# Patient Record
Sex: FEMALE | Employment: FULL TIME | URBAN - METROPOLITAN AREA
[De-identification: names, ages, dates, MRNs, and addresses within clinical notes are randomized per-mention and may not be internally consistent; named-entity substitution may affect disease eponyms.]

---

## 2020-05-10 ENCOUNTER — APPOINTMENT (OUTPATIENT)
Dept: GENERAL RADIOLOGY | Facility: CLINIC | Age: 45
End: 2020-05-10
Attending: EMERGENCY MEDICINE
Payer: MEDICAID

## 2020-05-10 ENCOUNTER — HOSPITAL ENCOUNTER (EMERGENCY)
Facility: CLINIC | Age: 45
Discharge: HOME | End: 2020-05-10
Attending: EMERGENCY MEDICINE | Admitting: EMERGENCY MEDICINE
Payer: MEDICAID

## 2020-05-10 ENCOUNTER — NURSE TRIAGE (OUTPATIENT)
Dept: NURSING | Facility: CLINIC | Age: 45
End: 2020-05-10

## 2020-05-10 VITALS
TEMPERATURE: 96.8 F | OXYGEN SATURATION: 98 % | DIASTOLIC BLOOD PRESSURE: 82 MMHG | HEART RATE: 64 BPM | RESPIRATION RATE: 18 BRPM | SYSTOLIC BLOOD PRESSURE: 121 MMHG

## 2020-05-10 DIAGNOSIS — R07.9 CHEST PAIN, UNSPECIFIED TYPE: ICD-10-CM

## 2020-05-10 DIAGNOSIS — R05.9 COUGH: ICD-10-CM

## 2020-05-10 LAB
ANION GAP SERPL CALCULATED.3IONS-SCNC: 4 MMOL/L (ref 3–14)
BASOPHILS # BLD AUTO: 0 10E9/L (ref 0–0.2)
BASOPHILS NFR BLD AUTO: 0.2 %
BUN SERPL-MCNC: 8 MG/DL (ref 7–30)
CALCIUM SERPL-MCNC: 9.1 MG/DL (ref 8.5–10.1)
CHLORIDE SERPL-SCNC: 106 MMOL/L (ref 94–109)
CO2 SERPL-SCNC: 28 MMOL/L (ref 20–32)
CREAT SERPL-MCNC: 0.57 MG/DL (ref 0.52–1.04)
D DIMER PPP FEU-MCNC: <0.3 UG/ML FEU (ref 0–0.5)
DIFFERENTIAL METHOD BLD: ABNORMAL
EOSINOPHIL # BLD AUTO: 0.1 10E9/L (ref 0–0.7)
EOSINOPHIL NFR BLD AUTO: 1.2 %
ERYTHROCYTE [DISTWIDTH] IN BLOOD BY AUTOMATED COUNT: 13.9 % (ref 10–15)
GFR SERPL CREATININE-BSD FRML MDRD: >90 ML/MIN/{1.73_M2}
GLUCOSE SERPL-MCNC: 95 MG/DL (ref 70–99)
HCT VFR BLD AUTO: 41.9 % (ref 35–47)
HGB BLD-MCNC: 13.4 G/DL (ref 11.7–15.7)
IMM GRANULOCYTES # BLD: 0 10E9/L (ref 0–0.4)
IMM GRANULOCYTES NFR BLD: 0.3 %
INTERPRETATION ECG - MUSE: NORMAL
LYMPHOCYTES # BLD AUTO: 2.1 10E9/L (ref 0.8–5.3)
LYMPHOCYTES NFR BLD AUTO: 33 %
MCH RBC QN AUTO: 25.2 PG (ref 26.5–33)
MCHC RBC AUTO-ENTMCNC: 32 G/DL (ref 31.5–36.5)
MCV RBC AUTO: 79 FL (ref 78–100)
MONOCYTES # BLD AUTO: 0.6 10E9/L (ref 0–1.3)
MONOCYTES NFR BLD AUTO: 8.8 %
NEUTROPHILS # BLD AUTO: 3.7 10E9/L (ref 1.6–8.3)
NEUTROPHILS NFR BLD AUTO: 56.5 %
NRBC # BLD AUTO: 0 10*3/UL
NRBC BLD AUTO-RTO: 0 /100
PLATELET # BLD AUTO: 414 10E9/L (ref 150–450)
POTASSIUM SERPL-SCNC: 4.2 MMOL/L (ref 3.4–5.3)
RBC # BLD AUTO: 5.31 10E12/L (ref 3.8–5.2)
SARS-COV-2 PCR COMMENT: ABNORMAL
SARS-COV-2 RNA SPEC QL NAA+PROBE: NORMAL
SARS-COV-2 RNA SPEC QL NAA+PROBE: POSITIVE
SODIUM SERPL-SCNC: 138 MMOL/L (ref 133–144)
SPECIMEN SOURCE: ABNORMAL
SPECIMEN SOURCE: NORMAL
TROPONIN I SERPL-MCNC: <0.015 UG/L (ref 0–0.04)
WBC # BLD AUTO: 6.5 10E9/L (ref 4–11)

## 2020-05-10 PROCEDURE — 87635 SARS-COV-2 COVID-19 AMP PRB: CPT | Performed by: EMERGENCY MEDICINE

## 2020-05-10 PROCEDURE — 99285 EMERGENCY DEPT VISIT HI MDM: CPT | Mod: 25

## 2020-05-10 PROCEDURE — 84484 ASSAY OF TROPONIN QUANT: CPT | Performed by: EMERGENCY MEDICINE

## 2020-05-10 PROCEDURE — 93005 ELECTROCARDIOGRAM TRACING: CPT

## 2020-05-10 PROCEDURE — 85025 COMPLETE CBC W/AUTO DIFF WBC: CPT | Performed by: EMERGENCY MEDICINE

## 2020-05-10 PROCEDURE — 80048 BASIC METABOLIC PNL TOTAL CA: CPT | Performed by: EMERGENCY MEDICINE

## 2020-05-10 PROCEDURE — 85379 FIBRIN DEGRADATION QUANT: CPT | Performed by: EMERGENCY MEDICINE

## 2020-05-10 PROCEDURE — 71045 X-RAY EXAM CHEST 1 VIEW: CPT

## 2020-05-10 ASSESSMENT — ENCOUNTER SYMPTOMS
SHORTNESS OF BREATH: 1
SORE THROAT: 0
FEVER: 1
VOMITING: 0
DIARRHEA: 0
COUGH: 1

## 2020-05-10 NOTE — ED AVS SNAPSHOT
Emergency Department  [address] [place] [address]  Phone:  [phone]  Fax:  [phone]                                    José Johnson   MRN: [id number]    Department:   Emergency Department   Date of Visit:  5/10/2020           After Visit Summary Signature Page    I have received my discharge instructions, and my questions have been answered. I have discussed any challenges I see with this plan with the nurse or doctor.    ..........................................................................................................................................  Patient/Patient Representative Signature      ..........................................................................................................................................  Patient Representative Print Name and Relationship to Patient    ..................................................               ................................................  Date                                   Time    ..........................................................................................................................................  Reviewed by Signature/Title    ...................................................              ..............................................  Date                                               Time          22EPIC Rev 08/18

## 2020-05-10 NOTE — TELEPHONE ENCOUNTER
"Mattie calls to say patient asked Mattie to call.  Patient speaks either Malaysian or Yi.  FNA advised it's best for patient to call in and  FNA get get an .    Mattie reports about 10 days ago, patient's  was not feeling good, was tested for COVID and was positive.   Then patient started having a fever and shortness of breath but this resolved.  This morning, patient had sudden pain back moving \"forward\" to her chest (left side).  Patient had described pain sharp throbbing.  Also, in the last couple of days, patient has  had numbness in left face and toes.  Patient is aspirin but Mattie does not know the amount.  She is taking it for a headache.  Patient just applied for insurance ([place]?) and it starts June 1st.  Patient is reluctant to go to the ED due to cost.  FNA advised patient may need to be seen in ED but she can call back to be triaged.        Reason for Disposition    SEVERE chest pain    Additional Information    [1] Caller is not with the adult (patient) AND [2] reporting urgent symptoms    Negative: Followed a chest injury    Negative: Severe difficulty breathing (e.g., struggling for each breath, speaks in single words)    Negative: Difficult to awaken or acting confused (e.g., disoriented, slurred speech)    Negative: Shock suspected (e.g., cold/pale/clammy skin, too weak to stand, low BP, rapid pulse)    Negative: [1] Chest pain lasts > 5 minutes AND [2] history of heart disease  (i.e., heart attack, bypass surgery, angina, angioplasty, CHF; not just a heart murmur)    Negative: [1] Chest pain lasts > 5 minutes AND [2] described as crushing, pressure-like, or heavy    Negative: [1] Chest pain lasts > 5 minutes AND [2] age > 50    Negative: [1] Chest pain lasts > 5 minutes AND [2] age > 30 AND [3] at least one cardiac risk factor (i.e., hypertension, diabetes, obesity, smoker or strong family history of heart disease)    Negative: [1] Chest pain lasts > 5 minutes AND [2] not relieved with " nitroglycerin    Negative: Passed out (i.e., lost consciousness, collapsed and was not responding)    Negative: Heart beating < 50 beats per minute OR > 140 beats per minute    Negative: Visible sweat on face or sweat dripping down face    Negative: Sounds like a life-threatening emergency to the triager    Protocols used: CHEST PAIN-A-AH, INFORMATION ONLY CALL-A-AH

## 2020-05-10 NOTE — ED PROVIDER NOTES
History     Chief Complaint:  Chest Pain; Shortness of Breath       The history is provided by the patient. A  was used ( Spanish).      José Johnson is a 44 year old female who presents for evaluation of intermittent left sided chest pain and shortness of breath starting this morning. She notes that the pain is sharp in nature. She endorses a slight cough that started a few days ago. The patient states that one week ago she had a fever of 38C which then resolved. She checked her blood pressure at home which was 140/100 which prompted her to call the nurse line and to be referred to the ED. She denies any vomiting, diarrhea, headache, sore throat, rash, recent travel or tobacco use. The patient states that her  recently tested positive for Covid-19 and is concerned for this. Of note, she had been taking care of her  when he was ill, but when they found that his Covid testing was positive she stopped being in close proximity to him.    Cardiac Risk Factors:  SEX:              female  Tobacco:              Negative  Hypertension:       Negative  Diabetes:             Negative  Lipids:                Negative  Personal history:  Negative  Family History:       Negative    PE and DVT Risk Factors:  Personal hx of PE/DVT:  Negative  Family hx of PE/DVT:   Negative  Recent travel:    Negative  Recent surgery:   Negative  Other immobilizations:  Negative  Hx cancer:    Negative  Hormone use:   Negative    Allergies:  No Known Drug Allergies    Medications:    The patient is not currently taking any prescribed medications.    Past Medical History:    History reviewed. No pertinent past medical history.    Past Surgical History:    History reviewed.  No pertinent past surgical history.    Family History:    History reviewed. No pertinent family history.    Social History:  The patient presents to the ED alone.  Smoking Status: Never Smoker  Smokeless Tobacco: Never Used  PCP: No  primary care provider on file.     Review of Systems   Constitutional: Positive for fever.   HENT: Negative for sore throat.    Respiratory: Positive for cough and shortness of breath.    Cardiovascular: Positive for chest pain.   Gastrointestinal: Negative for diarrhea and vomiting.   Skin: Negative for rash.   All other systems reviewed and are negative.      Physical Exam     Patient Vitals for the past 24 hrs:   BP Temp Temp src Pulse Resp SpO2   05/10/20 1312 121/82 -- -- 64 18 98 %   05/10/20 1200 121/82 -- -- 64 -- 98 %   05/10/20 1130 (!) 129/92 -- -- 62 -- 99 %   05/10/20 1100 (!) 128/96 -- -- 65 -- 97 %   05/10/20 0859 -- 96.8  F (36  C) Temporal -- -- --       Physical Exam    Physical Exam   Constitutional:  Patient is oriented to person, place, and time. They appear well-developed and well-nourished. Mild distress secondary to chest pain.    HENT:   Mouth/Throat:   Oropharynx is clear and moist.   Eyes:    Conjunctivae normal and EOM are normal. Pupils are equal, round, and reactive to light.   Neck:    Normal range of motion.   Cardiovascular: Normal rate, regular rhythm and normal heart sounds.  Exam reveals no gallop and no friction rub.  No murmur heard.  Pulmonary/Chest:  Effort normal and breath sounds normal. Patient has no wheezes. Patient has no rales.   Abdominal:   Soft. Bowel sounds are normal. Patient exhibits no mass. There is no tenderness. There is no rebound and no guarding.   Musculoskeletal:  Normal range of motion. Patient exhibits no edema.   Neurological:   Patient is alert and oriented to person, place, and time. Patient has normal strength. No cranial nerve deficit or sensory deficit. GCS 15  Skin:   Skin is warm and dry. No rash noted. No erythema.   Psychiatric:   Patient has a normal mood and affect. Patient's behavior is normal. Judgment and thought content normal.     Emergency Department Course     ECG:  Indication: Chest Pain  Time: 0929  Vent. Rate 69 bpm. AK interval  128. QRS duration 90. QT/QTc 390/417. P-R-T axis 51 33 42. Normal sinus rhythm. Normal ECG. Read time: 0956    Imaging:  Radiology findings were communicated with the patient who voiced understanding of the findings.    XR Chest Port 1 View:  Heart size is normal. Pulmonary vasculature is normal. Lungs are clear. No pleural fluid. No acute disease.  As per radiology.     Laboratory:  Laboratory findings were communicated with the patient who voiced understanding of the findings.    CBC: WBC: 6.5, HGB: 13.4, PLT: 414    BMP: WNL (Creatinine: 0.57)    D-dimer quantitative: <0.3    1043 Troponin: <0.015     COVID-19 Virus (Coronavirus) PCR: Pending      Emergency Department Course:  Past medical records, nursing notes, and vitals reviewed.    0908 I performed an exam of the patient as documented above.     EKG obtained in the ED, see results above.     IV was inserted and blood was drawn for laboratory testing, results above.    Portable chest x-ray was used at the patient's bedside, see results above.     1250 I rechecked the patient and discussed the results of her workup thus far. At this point I feel that the patient is safe for discharge, and the patient agrees.     Findings and plan explained to the patient. Patient discharged home with instructions regarding supportive care, medications, and reasons to return. The importance of close follow-up was reviewed.    I personally reviewed the laboratory and imaging results with the patient and answered all related questions prior to discharge.     Impression & Plan     Covid-19  José Johnson was evaluated during a global COVID-19 pandemic, which necessitated consideration that the patient might be at risk for infection with the SARS-CoV-2 virus that causes COVID-19.   Applicable protocols for evaluation were followed during the patient's care.   COVID-19 was considered as part of the patient's evaluation. The plan for testing is: a test was obtained during this  visit.    Medical Decision Making:  José Johnson is a 44 year old female who presents with symptoms concerning for Covid. Her  just tested positive for Covid today. She developed left-sided chest pain today and also felt short of breath with a slight cough. EKG shows no evidence of ischemia, arrhythmia or pericarditis. Basic blood work was obtained as well as chest x-ray and nasal swabs. Differential diagnosis is broad including ACS, arrhythmia, acute anemia, metabolic derangement, hypo/hyperglycemia, renal failure, PE, dissection, pericarditis, myocarditis, pneumothorax and pneumonia amongst others. At this time, Covid testing was performed and is in process. This will not come back in a timely manner, however suspicion is high that she has this as she was taking care of her  who tested positive. At this time I feel that she is safe to be discharged. She is not in any respiratory distress. She is afebrile here. I did discuss with her to quarantine for 7 days or until her fever is gone for 3 days. She will do a virtual visit with her primary care provider for follow up.        Diagnosis:    ICD-10-CM    1. Cough  R05 COVID-19 Virus (Coronavirus) by PCR Nasopharyngeal     SARS-CoV-2 COVID-19 Virus (Coronavirus) RT-PCR     SARS-CoV-2 COVID-19 Virus (Coronavirus) RT-PCR   2. Chest pain, unspecified type  R07.9        Disposition:  Discharged to home.    Scribe Disclosure:  I, Tito Dobbs, am serving as a scribe at 9:09 AM on 5/10/2020 to document services personally performed by Mary Beth Maharaj MD based on my observations and the provider's statements to me.      Mary Beth Maharaj MD  05/10/20 6090

## 2020-05-13 ENCOUNTER — PATIENT OUTREACH (OUTPATIENT)
Dept: CARE COORDINATION | Facility: CLINIC | Age: 45
End: 2020-05-13

## 2020-05-13 NOTE — PROGRESS NOTES
Clinic Care Coordination Contact    Situation:  Patient referral from 'No PCP/COVID Discharge List'      Background:  Patient was evaluated at [facility] on 5/10/20, diagnosed with COVID-19.  Patient has No PCP    Assessment:  RN Care Coordinator reviewed chart and noted [employer] [place] RN was unable to reach patient with positive results.      RN Care Coordinator called patient with Swiss , no answer, and no message left as Swiss  concerned patient did not speak Swiss based on pt's name and requested RN Care Coordinator call back another time with Swiss  to attempt to reach patient.      Plan/Recommendations:   RN Care Coordinator will attempt call back within 2-3 business days.      Mary Beth Melgoza RN Clinic Care Coordination

## 2020-05-22 NOTE — PROGRESS NOTES
"Clinic Care Coordination Contact    Situation:  Patient referral from 'No PCP/COVID Discharge List'      Background:  Patient was evaluated at [facility] on 5/10/20, diagnosed with COVID-19.     Assessment:  Patient reports still \"some\" cough and breathing difficulties.  States she went to [facility] yesterday because of SOB and difficulty breathing, Dr. Stack, and was prescribed an inhaler and prednisone for her breathing.  States she hasn't taken the prednisone yet, but used the inhaler last night with good results and relief.  States for the last week she \"couldn't breathe,\" tried lying on her stomach to help in breathing but due to persisting sx she was seen.  Hasn't had a fever for the last week.  Cough is still there but not as frequent.     Pt confirmed she has been isolating herself at home, and has only gone out to go to the doctor since she got sick a month ago; she always wears a mask and gloves when out.    Pt states she arrived in [place] 8 months ago, and hasn't established care with a PCP yet; however, she plans to establish care with Dr. Stack at [facility], whom she saw yesterday, as she speaks [place] and family liked her.      Plan/Recommendations:     Continue self-quarantine, good handwashing    Continue prednisone and inhaler as prescribed    Stay hydrated and push fluids; warm fluids can help with coughing spasms to relax and calm the airways, hot showers/steam can also help with this    Eat small frequent meals throughout the day    Follow up with [facility]    Pt verbalized understanding and agrees with plan       Mary Beth Melgoza RN Clinic Care Coordination   "